# Patient Record
Sex: FEMALE | Race: BLACK OR AFRICAN AMERICAN
[De-identification: names, ages, dates, MRNs, and addresses within clinical notes are randomized per-mention and may not be internally consistent; named-entity substitution may affect disease eponyms.]

---

## 2018-03-18 NOTE — EDM.PDOC
ED HPI GENERAL MEDICAL PROBLEM





- General


Chief Complaint: ENT Problem


Stated Complaint: FEELING WELL LAST 3 DAYS, CHEST PAIN [id number]


Time Seen by Provider: 03/18/18 21:55


Source of Information: Reports: Patient


History Limitations: Reports: No Limitations





- History of Present Illness


INITIAL COMMENTS - FREE TEXT/NARRATIVE: 





3 days h/o cough sore throat poor appetite


Treatments PTA: Reports: Acetaminophen


  ** Throat


Pain Score (Numeric/FACES): 6





- Related Data


 Allergies











Allergy/AdvReac Type Severity Reaction Status Date / Time


 


No Known Allergies Allergy   Verified 03/18/18 21:51











Home Meds: 


 Home Meds





Acetaminophen [Tylenol Extra Strength] 500 mg PO Q6H PRN 08/03/15 [History]


levETIRAcetam [Levetiracetam] 750 mg PO BID 08/03/15 [History]











Past Medical History


OB/GYN History: Reports: Pregnancy


Neurological History: Reports: Seizure


Endocrine/Metabolic History: Reports: Obesity/BMI 30+





- Past Surgical History


GI Surgical History: Reports: Cholecystectomy





Social & Family History





- Family History


Family Medical History: Noncontributory





- Tobacco Use


Smoking Status *Q: Never Smoker


Second Hand Smoke Exposure: No





- Caffeine Use


Caffeine Use: Reports: Tea





- Alcohol Use


Days Per Week of Alcohol Use: 0





- Recreational Drug Use


Recreational Drug Use: No





- Living Situation & Occupation


Living situation: Reports: , with Family


Occupation: Employed





ED ROS GENERAL





- Review of Systems


Review Of Systems: ROS reveals no pertinent complaints other than HPI.





ED EXAM, GENERAL





- Physical Exam


Exam: See Below


Exam Limited By: No Limitations


General Appearance: Alert, WD/WN, Mild Distress, Other (cough spasm)


Ears: Hearing Grossly Normal


Throat/Mouth: Normal Voice, No Airway Compromise, Inflammation


Head: Atraumatic


Neck: Non-Tender, Full Range of Motion


Respiratory/Chest: No Accessory Muscle Use, Rhonchi, Wheezing.  No: Decreased 

Breath Sounds


Cardiovascular: Regular Rate, Rhythm


GI/Abdominal: Soft, Non-Tender


Neurological: Alert, Oriented, Normal Cognition, Normal Gait, No Motor/Sensory 

Deficits


Psychiatric: Normal Affect, Normal Mood


Skin Exam: Warm, Dry, Normal Color


Lymphatic: No Adenopathy





Course





- Vital Signs


Last Recorded V/S: 


 Last Vital Signs











Temp  35.7 C   03/18/18 21:44


 


Pulse  107 H  03/18/18 21:44


 


Resp  19   03/18/18 21:44


 


BP  122/78   03/18/18 21:44


 


Pulse Ox  100   03/18/18 21:44














- Orders/Labs/Meds


Orders: 


 Active Orders 24 hr











 Category Date Time Status


 


 RT Aerosol Therapy [RC] ASDIRECTED Care  03/18/18 21:54 Active


 


 CULTURE STREP A CONFIRMATION [RM] Stat Lab  03/18/18 21:53 Results


 


 STREP SCRN A RAPID W CULT CONF [RM] Stat Lab  03/18/18 21:53 Results











Meds: 


Medications














Discontinued Medications














Generic Name Dose Route Start Last Admin





  Trade Name Freq  PRN Reason Stop Dose Admin


 


Albuterol/Ipratropium  3 ml  03/18/18 21:54  03/18/18 21:57





  Duoneb 3.0-0.5 Mg/3 Ml  NEB  03/18/18 21:55  3 ml





  ONETIME ONE   Administration














- Re-Assessments/Exams


Free Text/Narrative Re-Assessment/Exam: 





03/18/18 22:28


s/p duoneb=much better





Departure





- Departure


Time of Disposition: 22:29


Disposition: Home, Self-Care 01


Condition: Good


Clinical Impression: 


 Bronchospasm with bronchitis, acute








- Discharge Information


Instructions:  Acute Bronchitis, Adult, Easy-to-Read


Forms:  ED Department Discharge


Additional Instructions: 


1) sleep as much as possible


2) drink lots of liquids


3) use neb 3 times daily for cough & wheeze


4) recheck as needed





rx given;


albuterol solution 2.5mg tid prn





- My Orders


Last 24 Hours: 


My Active Orders





03/18/18 21:53


CULTURE STREP A CONFIRMATION [RM] Stat 


STREP SCRN A RAPID W CULT CONF [RM] Stat 





03/18/18 21:54


RT Aerosol Therapy [RC] ASDIRECTED 














- Assessment/Plan


Last 24 Hours: 


My Active Orders





03/18/18 21:53


CULTURE STREP A CONFIRMATION [RM] Stat 


STREP SCRN A RAPID W CULT CONF [RM] Stat 





03/18/18 21:54


RT Aerosol Therapy [RC] ASDIRECTED

## 2018-03-19 NOTE — EDM.PDOC
ED HPI GENERAL MEDICAL PROBLEM





- General


Chief Complaint: Chest Pain


Stated Complaint: [id number] CHEST PAIN,WAIST PAIN


Time Seen by Provider: 03/19/18 19:35


Source of Information: Reports: Patient


History Limitations: Reports: No Limitations





- History of Present Illness


INITIAL COMMENTS - FREE TEXT/NARRATIVE: 





c/o continued cough, chest pain with coughing around ribs, upper abdomen and 

back. Slight intermittent fever. 


  ** Mid-Sternal Chest


Pain Score (Numeric/FACES): 10





- Related Data


 Allergies











Allergy/AdvReac Type Severity Reaction Status Date / Time


 


No Known Allergies Allergy   Verified 03/19/18 18:15











Home Meds: 


 Home Meds





Acetaminophen [Tylenol Extra Strength] 500 mg PO Q6H PRN 08/03/15 [History]


levETIRAcetam [Levetiracetam] 750 mg PO BID 08/03/15 [History]


Albuterol Sulfate 0.63 mg IH ASDIRECTED PRN 03/19/18 [History]











Past Medical History


HEENT History: Reports: None


Cardiovascular History: Reports: None


Respiratory History: Reports: None


Gastrointestinal History: Reports: None


Genitourinary History: Reports: None


OB/GYN History: Reports: Pregnancy


Musculoskeletal History: Reports: None


Neurological History: Reports: Seizure


Psychiatric History: Reports: None


Endocrine/Metabolic History: Reports: Obesity/BMI 30+


Hematologic History: Reports: None


Immunologic History: Reports: None


Oncologic (Cancer) History: Reports: None


Dermatologic History: Reports: None





- Past Surgical History


Head Surgeries/Procedures: Reports: None


GI Surgical History: Reports: Cholecystectomy





Social & Family History





- Family History


Family Medical History: Noncontributory





- Tobacco Use


Smoking Status *Q: Never Smoker


Second Hand Smoke Exposure: No





- Caffeine Use


Caffeine Use: Reports: Tea





- Alcohol Use


Days Per Week of Alcohol Use: 0





- Recreational Drug Use


Recreational Drug Use: No





- Living Situation & Occupation


Living situation: Reports: , with Family


Occupation: Employed





ED ROS GENERAL





- Review of Systems


Review Of Systems: See Below


Constitutional: Reports: Chills


Respiratory: Reports: Wheezing, Pleuritic Chest Pain, Cough


Cardiovascular: Reports: Chest Pain (with coughing).  Denies: Dyspnea on 

Exertion


GI/Abdominal: Reports: No Symptoms


Skin: Reports: No Symptoms


Neurological: Reports: No Symptoms


Psychiatric: Reports: No Symptoms





ED EXAM, GENERAL





- Physical Exam


Exam: See Below


Exam Limited By: No Limitations


General Appearance: Alert, No Apparent Distress


Eye Exam: Bilateral Eye: EOMI


Ears: Normal External Exam, Normal TMs


Throat/Mouth: Normal Inspection, Normal Lips, Other (lips cracked).  No: Normal 

Voice (hoarse)


Head: Atraumatic, Normocephalic


Neck: Normal Inspection, Supple, Non-Tender.  No: Lymphadenopathy (L), 

Lymphadenopathy (R)


Respiratory/Chest: No Respiratory Distress, Lungs Clear, Decreased Breath Sounds


Cardiovascular: Normal Peripheral Pulses, Regular Rate, Rhythm


GI/Abdominal: Normal Bowel Sounds, Soft


Back Exam: Full Range of Motion


Extremities: Normal Inspection


Neurological: Alert, Oriented, Normal Cognition


Psychiatric: Normal Affect, Normal Mood


Skin Exam: Warm, Dry, Intact, Normal Color





Course





- Vital Signs


Last Recorded V/S: 


 Last Vital Signs











Temp  97.4 F   03/19/18 18:09


 


Pulse  97   03/19/18 18:09


 


Resp  16   03/19/18 18:09


 


BP  124/83   03/19/18 18:09


 


Pulse Ox  99   03/19/18 18:09














- Orders/Labs/Meds


Orders: 


 Active Orders 24 hr











 Category Date Time Status


 


 EKG Documentation Completion [RC] URGENT Care  03/19/18 18:30 Active











Labs: 


 Laboratory Tests











  03/19/18 03/19/18 Range/Units





  18:39 18:39 


 


WBC  4.5 L   (5.0-10.0)  10^3/uL


 


RBC  4.31   (4.2-5.4)  10^6/uL


 


Hgb  11.4 L D   (12.0-16.0)  g/dL


 


Hct  34.8 L   (37.0-47.0)  %


 


MCV  80.7  D   ()  fL


 


MCH  26.5 L   (27.0-34.0)  pg


 


MCHC  32.8 L   (33.0-35.0)  g/dL


 


Plt Count  233  D   (150-450)  10^3/uL


 


Neut % (Auto)  38.9 L   (42.2-75.2)  %


 


Lymph % (Auto)  44.8   (20.5-50.1)  %


 


Mono % (Auto)  15.9 H   (2-8)  %


 


Eos % (Auto)  0.2 L   (1.0-3.0)  %


 


Baso % (Auto)  0.2   (0.0-1.0)  %


 


Sodium   137  (135-145)  mmol/L


 


Potassium   3.5 L  (3.6-5.0)  mmol/L


 


Chloride   107  (101-111)  mmol/L


 


Carbon Dioxide   23.0  (21.0-31.0)  mmol/L


 


Anion Gap   10.5  


 


BUN   5 L  (7-18)  mg/dL


 


Creatinine   0.7  (0.6-1.3)  mg/dL


 


Est Cr Clr Drug Dosing   113.88  mL/min


 


Estimated GFR (MDRD)   > 60  


 


BUN/Creatinine Ratio   7.14  


 


Glucose   105  ()  mg/dL


 


Calcium   8.0 L  (8.4-10.2)  mg/dl


 


Total Bilirubin   0.6  (0.2-1.0)  mg/dL


 


AST   29  (10-42)  IU/L


 


ALT   20  (10-60)  IU/L


 


Alkaline Phosphatase   59  ()  IU/L


 


Troponin I   < 0.02  (0.00-0.02)  ng/ml


 


Total Protein   7.2  (6.7-8.2)  g/dl


 


Albumin   3.7  (3.2-5.5)  g/dl


 


Globulin   3.5  


 


Albumin/Globulin Ratio   1.06  














- Radiology Interpretation


Free Text/Narrative:: 





CXR: normal





Departure





- Departure


Time of Disposition: 19:43


Disposition: Home, Self-Care 01


Condition: Good


Clinical Impression: 


 Bronchitis





URI (upper respiratory infection)


Qualifiers:


 URI type: unspecified viral URI Qualified Code(s): J06.9 - Acute upper 

respiratory infection, unspecified








- Discharge Information


Instructions:  Viral Respiratory Infection, Easy-To-Read


Referrals: 


PCP,None [Ordering Only Provider] - 


Forms:  ED Department Discharge


Additional Instructions: 


humidification


Robitussin per label for cough


alternate tylenol and ibuprofen for fever/discomfort


increase fluid intake

## 2018-03-21 NOTE — EKG
03/19/2018 - PAULA STUART -

 

This 12-lead EKG shows normal sinus rhythm with a ventricular rate of 87.

Normal axis and intervals.  No acute ST-segment or T-wave changes.

 

DD:  03/21/2018 06:01:53

DT:  03/21/2018 06:16:13

[facility]

Job #:  [id number]

## 2020-08-23 ENCOUNTER — HOSPITAL ENCOUNTER (EMERGENCY)
Dept: HOSPITAL 43 - DL.ED | Age: 40
Discharge: HOME | End: 2020-08-23
Payer: COMMERCIAL

## 2020-08-23 VITALS — HEART RATE: 188 BPM | SYSTOLIC BLOOD PRESSURE: 110 MMHG | DIASTOLIC BLOOD PRESSURE: 75 MMHG

## 2020-08-23 DIAGNOSIS — Z79.899: ICD-10-CM

## 2020-08-23 DIAGNOSIS — R56.9: ICD-10-CM
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
## 2020-08-23 DIAGNOSIS — S29.011A: ICD-10-CM

## 2020-08-23 DIAGNOSIS — U07.1: Primary | ICD-10-CM

## 2020-08-23 DIAGNOSIS — E66.9: ICD-10-CM

## 2020-08-23 LAB
ANION GAP SERPL CALC-SCNC: 19.4 MEQ/L (ref 7–13)
CHLORIDE SERPL-SCNC: 99 MMOL/L (ref 98–107)
SODIUM SERPL-SCNC: 138 MMOL/L (ref 136–145)

## 2020-08-23 PROCEDURE — 80053 COMPREHEN METABOLIC PANEL: CPT

## 2020-08-23 PROCEDURE — 99284 EMERGENCY DEPT VISIT MOD MDM: CPT

## 2020-08-23 PROCEDURE — 81001 URINALYSIS AUTO W/SCOPE: CPT

## 2020-08-23 PROCEDURE — 71045 X-RAY EXAM CHEST 1 VIEW: CPT

## 2020-08-23 PROCEDURE — U0002 COVID-19 LAB TEST NON-CDC: HCPCS

## 2020-08-23 PROCEDURE — 85025 COMPLETE CBC W/AUTO DIFF WBC: CPT

## 2020-08-23 PROCEDURE — 99283 EMERGENCY DEPT VISIT LOW MDM: CPT

## 2020-08-23 PROCEDURE — 36415 COLL VENOUS BLD VENIPUNCTURE: CPT

## 2020-08-23 PROCEDURE — 87635 SARS-COV-2 COVID-19 AMP PRB: CPT

## 2020-08-23 PROCEDURE — 87040 BLOOD CULTURE FOR BACTERIA: CPT

## 2020-08-23 PROCEDURE — 83605 ASSAY OF LACTIC ACID: CPT

## 2020-08-23 PROCEDURE — 96374 THER/PROPH/DIAG INJ IV PUSH: CPT

## 2020-08-23 PROCEDURE — 71260 CT THORAX DX C+: CPT

## 2020-08-23 NOTE — CR
PROCEDURE INFORMATION: 

Exam: XR Chest, 1 View 

Exam date and time: 8/23/2020 10:24 AM 

Age: 39 years old 

Clinical indication: Shortness of breath 



TECHNIQUE: 

Imaging protocol: XR of the chest 

Views: 1 view. 



COMPARISON: 

CR Chest 1V Frontal 3/19/2018 6:55 PM 



FINDINGS: 

Lungs: Lung volumes are decreased bilaterally. Bilateral transverse lung 

densities appear to be compatible with atelectasis, right greater than left. 

There are no additional coalescing infiltrates. 

Pleural space: No pleural effusion. No pneumothorax. 

Heart/Mediastinum: The mediastinal contour is normal. No cardiomegaly. 

Bones/joints: The skeletal structures and soft tissues show no evidence of 

fracture or other acute processes. 



IMPRESSION: 

1. Decreased lung volumes with probable bibasilar atelectatic changes. Upright 

two view chest would be helpful for further characterization. If patient is 

unable to cooperate consider noncontrast CT chest.

## 2020-08-23 NOTE — EDM.PDOC
Scribed by Kaya Olvera 08/23/20 1414 for Onelia Benitez MD





ED HPI GENERAL MEDICAL PROBLEM





- General


Chief Complaint: Respiratory Problem


Stated Complaint: HURTS TO BREATH


Time Seen by Provider: 08/23/20 09:50


Source of Information: Reports: Patient, RN, RN Notes Reviewed


History Limitations: Reports: No Limitations





- History of Present Illness


INITIAL COMMENTS - FREE TEXT/NARRATIVE: 


Patient presents to ED stating her pain started on Tuesday with cough. Her 

 was tested positive for COVID. The pain started 2 days ago to right back

and radiates to front. It is sharp up to 10/10 off /on. The cough is productive.




Onset: Gradual


Duration: Getting Worse


Location: Reports: Chest, Back


Quality: Reports: Ache


Severity: Moderate


Improves with: Reports: None


Worsens with: Reports: None


Associated Symptoms: Reports: No Other Symptoms


Treatments PTA: Reports: Acetaminophen


  ** Right Thoracic


Pain Score (Numeric/FACES): 10





- Related Data


                                    Allergies











Allergy/AdvReac Type Severity Reaction Status Date / Time


 


No Known Allergies Allergy   Verified 08/23/20 09:32











Home Meds: 


                                    Home Meds





Acetaminophen [Tylenol Extra Strength] 500 mg PO Q6H PRN 08/03/15 [History]


levETIRAcetam [Levetiracetam] 750 mg PO BID 08/03/15 [History]


Albuterol Sulfate 0.63 mg IH ASDIRECTED PRN 03/19/18 [History]











Past Medical History


HEENT History: Reports: None


Cardiovascular History: Reports: None


Respiratory History: Reports: None


Gastrointestinal History: Reports: None


Genitourinary History: Reports: None


OB/GYN History: Reports: Pregnancy


Musculoskeletal History: Reports: None


Neurological History: Reports: Seizure


Psychiatric History: Reports: None


Endocrine/Metabolic History: Reports: Obesity/BMI 30+


Hematologic History: Reports: None


Immunologic History: Reports: None


Oncologic (Cancer) History: Reports: None


Dermatologic History: Reports: None





- Past Surgical History


Head Surgeries/Procedures: Reports: None


GI Surgical History: Reports: Cholecystectomy





Social & Family History





- Family History


Family Medical History: Noncontributory





- Tobacco Use


Smoking Status *Q: Never Smoker


Second Hand Smoke Exposure: No





- Caffeine Use


Caffeine Use: Reports: Tea





- Recreational Drug Use


Recreational Drug Use: No





- Living Situation & Occupation


Living situation: Reports: , with Family


Occupation: Employed





ED ROS GENERAL





- Review of Systems


Review Of Systems: Comprehensive ROS is negative, except as noted in HPI.





ED EXAM, GENERAL





- Physical Exam


Exam: See Below


Exam Limited By: No Limitations


General Appearance: Mild Distress (2/2 pain. )


Nose: Normal Inspection


Head: Atraumatic, Normocephalic


Neck: Normal Inspection


Respiratory/Chest: No Accessory Muscle Use, Decreased Breath Sounds, Other (mild

 respiratory distress. TTP right lower chest. )


Cardiovascular: No Murmur, Tachycardia


GI/Abdominal: Normal Bowel Sounds, Soft, Non-Tender, No Organomegaly, No 

Distention, No Abnormal Bruit, No Mass


 (Female) Exam: Deferred


Rectal (Female) Exam: Deferred


Back Exam: Normal Inspection, Full Range of Motion, NT


Extremities: Normal Inspection, Normal Range of Motion, Non-Tender, Normal 

Capillary Refill, No Pedal Edema


Neurological: Alert, Oriented, CN II-XII Intact, Normal Cognition, Normal Gait, 

Normal Reflexes, No Motor/Sensory Deficits


Psychiatric: Normal Affect, Normal Mood


Skin Exam: Warm, Dry, Intact, Normal Color, No Rash


Lymphatic: No Adenopathy





Course





- Vital Signs


Last Recorded V/S: 


                                Last Vital Signs











Temp  96.6 F L  08/23/20 09:19


 


Pulse  188 H  08/23/20 09:19


 


Resp  20   08/23/20 09:19


 


BP  110/75   08/23/20 09:19


 


Pulse Ox  95   08/23/20 09:19














- Orders/Labs/Meds


Orders: 


                               Active Orders 24 hr











 Category Date Time Status


 


 CULTURE BLOOD [BC] Stat Lab  08/23/20 10:12 Received


 


 Blood Culture x2 Reflex Set [OM.PC] Stat Oth  08/23/20 09:52 Ordered


 


 Peripheral IV Insertion Adult [OM.PC] Stat Oth  08/23/20 09:52 Ordered











Labs: 


                                Laboratory Tests











  08/23/20 08/23/20 08/23/20 Range/Units





  09:22 10:12 10:12 


 


WBC   5.5   (5.0-10.0)  10^3/uL


 


RBC   5.22   (4.2-5.4)  10^6/uL


 


Hgb   13.5  D   (12.0-16.0)  g/dL


 


Hct   40.9   (37.0-47.0)  %


 


MCV   78.4 L   ()  fL


 


MCH   25.9 L   (27.0-34.0)  pg


 


MCHC   33.0   (33.0-35.0)  g/dL


 


Plt Count   219   (150-450)  10^3/uL


 


Neut % (Auto)   58.5   (42.2-75.2)  %


 


Lymph % (Auto)   32.8   (20.5-50.1)  %


 


Mono % (Auto)   8.7 H   (2-8)  %


 


Eos % (Auto)   0.0 L   (1.0-3.0)  %


 


Baso % (Auto)   0.0   (0.0-1.0)  %


 


Sodium    138  (136-145)  mmol/L


 


Potassium    3.4 L  (3.5-5.1)  mmol/L


 


Chloride    99  ()  mmol/L


 


Carbon Dioxide    23  (21-32)  mmol/L


 


Anion Gap    19.4 H  (7-13)  mEq/L


 


BUN    6 L  (7-18)  mg/dL


 


Creatinine    1.13 H  (0.55-1.02)  mg/dL


 


Est Cr Clr Drug Dosing    69.16  mL/min


 


Estimated GFR (MDRD)    > 60  


 


BUN/Creatinine Ratio    5.3  (No establ ref range)  


 


Glucose    120 H  (74-99)  mg/dL


 


Lactic Acid     (0.4-2.0)  mmol/L


 


Calcium    8.1 L  (8.5-10.1)  mg/dL


 


Total Bilirubin    0.4  (0.2-1.0)  mg/dL


 


AST    55 H  (15-37)  U/L


 


ALT    72 H  (14-59)  U/L


 


Alkaline Phosphatase    64  ()  U/L


 


Total Protein    8.8 H  (6.4-8.2)  g/dL


 


Albumin    3.9  (3.4-5.0)  g/dL


 


Globulin    4.9  


 


Albumin/Globulin Ratio    0.8  


 


Urine Color     (YELLOW)  


 


Urine Appearance     (CLEAR)  


 


Urine pH     (5.0-9.0)  


 


Ur Specific Gravity     (1.005-1.030)  


 


Urine Protein     (NEGATIVE)  


 


Urine Glucose (UA)     (NEGATIVE)  


 


Urine Ketones     (NEGATIVE)  


 


Urine Occult Blood     (NEGATIVE)  


 


Urine Nitrite     (NEGATIVE)  


 


Urine Bilirubin     (NEGATIVE)  


 


Urine Urobilinogen     (0.2-1.0)  mg/dL


 


Ur Leukocyte Esterase     (NEGATIVE)  


 


U Hyaline Cast (Auto)     


 


Urine RBC     /HPF


 


Urine WBC     (0-5/HPF)  /HPF


 


Ur Epithelial Cells     (NOT SEEN)  /HPF


 


Amorphous Sediment     (NOT SEEN)  /HPF


 


Urine Bacteria     (0-FEW/HPF)  /HPF


 


Fine Granular Casts     (NOT SEEN)  /LPF


 


Urine Mucus     (NOT SEEN)  /LPF


 


COVID-19 ([facility])  Positive H    (NEGATIVE)  














  08/23/20 08/23/20 Range/Units





  10:12 10:41 


 


WBC    (5.0-10.0)  10^3/uL


 


RBC    (4.2-5.4)  10^6/uL


 


Hgb    (12.0-16.0)  g/dL


 


Hct    (37.0-47.0)  %


 


MCV    ()  fL


 


MCH    (27.0-34.0)  pg


 


MCHC    (33.0-35.0)  g/dL


 


Plt Count    (150-450)  10^3/uL


 


Neut % (Auto)    (42.2-75.2)  %


 


Lymph % (Auto)    (20.5-50.1)  %


 


Mono % (Auto)    (2-8)  %


 


Eos % (Auto)    (1.0-3.0)  %


 


Baso % (Auto)    (0.0-1.0)  %


 


Sodium    (136-145)  mmol/L


 


Potassium    (3.5-5.1)  mmol/L


 


Chloride    ()  mmol/L


 


Carbon Dioxide    (21-32)  mmol/L


 


Anion Gap    (7-13)  mEq/L


 


BUN    (7-18)  mg/dL


 


Creatinine    (0.55-1.02)  mg/dL


 


Est Cr Clr Drug Dosing    mL/min


 


Estimated GFR (MDRD)    


 


BUN/Creatinine Ratio    (No establ ref range)  


 


Glucose    (74-99)  mg/dL


 


Lactic Acid  1.0   (0.4-2.0)  mmol/L


 


Calcium    (8.5-10.1)  mg/dL


 


Total Bilirubin    (0.2-1.0)  mg/dL


 


AST    (15-37)  U/L


 


ALT    (14-59)  U/L


 


Alkaline Phosphatase    ()  U/L


 


Total Protein    (6.4-8.2)  g/dL


 


Albumin    (3.4-5.0)  g/dL


 


Globulin    


 


Albumin/Globulin Ratio    


 


Urine Color   Dark yellow  (YELLOW)  


 


Urine Appearance   Cloudy  (CLEAR)  


 


Urine pH   6.5  (5.0-9.0)  


 


Ur Specific Gravity   >= 1.030  (1.005-1.030)  


 


Urine Protein   100 H  (NEGATIVE)  


 


Urine Glucose (UA)   Negative  (NEGATIVE)  


 


Urine Ketones   >=160 H  (NEGATIVE)  


 


Urine Occult Blood   Negative  (NEGATIVE)  


 


Urine Nitrite   Negative  (NEGATIVE)  


 


Urine Bilirubin   Small H  (NEGATIVE)  


 


Urine Urobilinogen   2.0 H  (0.2-1.0)  mg/dL


 


Ur Leukocyte Esterase   Negative  (NEGATIVE)  


 


U Hyaline Cast (Auto)   Few  


 


Urine RBC   0-5  /HPF


 


Urine WBC   0-5  (0-5/HPF)  /HPF


 


Ur Epithelial Cells   Moderate H  (NOT SEEN)  /HPF


 


Amorphous Sediment   Moderate H  (NOT SEEN)  /HPF


 


Urine Bacteria   Few  (0-FEW/HPF)  /HPF


 


Fine Granular Casts   Rare H  (NOT SEEN)  /LPF


 


Urine Mucus   Moderate H  (NOT SEEN)  /LPF


 


COVID-19 ([facility])    (NEGATIVE)  











Meds: 


Medications














Discontinued Medications














Generic Name Dose Route Start Last Admin





  Trade Name Freq  PRN Reason Stop Dose Admin


 


Fentanyl  50 mcg  08/23/20 11:08  08/23/20 11:20





  Sublimaze  IVPUSH  08/23/20 11:09  50 mcg





  ONETIME ONE   Administration


 


Iopamidol  100 ml  08/23/20 12:11  08/23/20 12:28





  Isovue-370 (76%)  IVPUSH  08/23/20 12:12  80 ml





  ONETIME ONE   Administration


 


Sodium Chloride  10 ml  08/23/20 09:52  08/23/20 11:19





  Saline Flush  FLUSH   10 ml





  ASDIRECTED PRN   Administration





  Keep Vein Open  














Departure





- Departure


Time of Disposition: 13:05


Disposition: Home, Self-Care 01


Condition: Fair


Clinical Impression: 


 COVID-19





Muscle strain of chest wall


Qualifiers:


 Encounter type: initial encounter Qualified Code(s): S29.011A - Strain of 

muscle and tendon of front wall of thorax, initial encounter








- Discharge Information


*PRESCRIPTION DRUG MONITORING PROGRAM REVIEWED*: Not Applicable


*COPY OF PRESCRIPTION DRUG MONITORING REPORT IN PATIENT GOLD: Not Applicable


Instructions:  Muscle Strain, Easy-to-Read, Coronavirus Information 03/16/20, 

Prevent the Spread of COVID-19 if You Are Sick - CDC


Forms:  ED Department Discharge


Additional Instructions: 


Take Ibuprofen and Tylenol as directed


Quarantine at home for at least 14 days. 


Follow up with PCP as needed. 


 





Sepsis Event Note (ED)





- Evaluation


Sepsis Screening Result: Possible Sepsis Risk





- Focused Exam


Vital Signs: 


                                   Vital Signs











  Temp Pulse Resp BP Pulse Ox


 


 08/23/20 09:19  96.6 F L  188 H  20  110/75  95














- My Orders


Last 24 Hours: 


My Active Orders





08/23/20 09:52


Blood Culture x2 Reflex Set [OM.PC] Stat 


Peripheral IV Insertion Adult [OM.PC] Stat 





08/23/20 10:12


CULTURE BLOOD [BC] Stat 














- Assessment/Plan


Last 24 Hours: 


My Active Orders





08/23/20 09:52


Blood Culture x2 Reflex Set [OM.PC] Stat 


Peripheral IV Insertion Adult [OM.PC] Stat 





08/23/20 10:12


CULTURE BLOOD [BC] Stat 














I have read and agree with the documentation that has been completed regarding 

this visit. By signing this record, I attest that the documentation was 

completed in my physical presence and is an accurate record of the encounter.

## 2020-08-23 NOTE — CT
PROCEDURE INFORMATION: 

Exam: CT Chest With Contrast 

Exam date and time: 8/23/2020 11:29 AM 

Age: 39 years old 

Clinical indication: Shortness of breath; Additional info: Shortness of breath, 

recent tubal ligation 



TECHNIQUE: 

Imaging protocol: Computed tomography of the chest with intravenous contrast. 

Radiation optimization: All CT scans at this facility use at least one of these 

dose optimization techniques: automated exposure control; mA and/or kV 

adjustment per patient size (includes targeted exams where dose is matched to 

clinical indication); or iterative reconstruction. 

Contrast material: ISOVUE 370; Contrast volume: 80 ml; Contrast route: 

INTRAVENOUS (IV);  



COMPARISON: 

CR Chest 1V Frontal 8/23/2020 10:24 AM 



FINDINGS: 

Thoracic inlet: Normal. No thyroid masses.

Lungs: There are decreased bilateral lung volumes. There are multifocal 

peripheral small opacities throughout the lungs right greater than left. 

Bilateral bibasilar atelectasis. 

Pleural space: No pneumothorax. No pleural effusion. 

Heart: The cardiac structures are normal. 

Mediastinal space: The mediastinal contour is normal. 

Pulmonary arteries: No filling defects within the main or bilateral pulmonary 

arteries. Poor opacification of right lower lobe subsegmental pulmonary 

branches. 

Aorta: The aorta has normal caliber and opacification without dissection or 

aneurysm. 

Lymph nodes: No enlarged lymph nodes. 



Bones/joints: The skeletal structures and soft tissues show no evidence of 

fracture or other acute processes. 

Soft tissues: Unremarkable. 



IMPRESSION: 

1. Multiple small bilateral peripheral ground glass opacities or infiltrates. 

Differential diagnosis compatible with pneumonitis of the infectious and 

noninfectious variety. 

2. Bilateral basilar discoid atelectasis, right greater than left. 

3. No evidence of pulmonary embolic disease within the main, right and left 

pulmonary arteries. Subsegmental branches not well opacified. If PO2 decreases 

or patient does not respond to conservative therapy consider repeat CTA of the 

chest.

## 2021-07-22 ENCOUNTER — HOSPITAL ENCOUNTER (EMERGENCY)
Dept: HOSPITAL 43 - DL.ED | Age: 41
Discharge: NURSING, REHAB OR LONG-TERM CARE | End: 2021-07-22
Payer: COMMERCIAL

## 2021-07-22 VITALS — SYSTOLIC BLOOD PRESSURE: 126 MMHG | DIASTOLIC BLOOD PRESSURE: 80 MMHG | HEART RATE: 113 BPM

## 2021-07-22 DIAGNOSIS — Z79.899: ICD-10-CM

## 2021-07-22 DIAGNOSIS — E66.9: ICD-10-CM
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
## 2021-07-22 DIAGNOSIS — R56.9: Primary | ICD-10-CM

## 2021-07-22 DIAGNOSIS — R79.89: ICD-10-CM
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
## 2021-07-22 LAB
ANION GAP SERPL CALC-SCNC: 18.4 MEQ/L (ref 7–13)
CHLORIDE SERPL-SCNC: 102 MMOL/L (ref 98–107)
SODIUM SERPL-SCNC: 137 MMOL/L (ref 136–145)

## 2021-07-22 PROCEDURE — 70450 CT HEAD/BRAIN W/O DYE: CPT

## 2021-07-22 PROCEDURE — 80053 COMPREHEN METABOLIC PANEL: CPT

## 2021-07-22 PROCEDURE — 80307 DRUG TEST PRSMV CHEM ANLYZR: CPT

## 2021-07-22 PROCEDURE — 86140 C-REACTIVE PROTEIN: CPT

## 2021-07-22 PROCEDURE — 99285 EMERGENCY DEPT VISIT HI MDM: CPT

## 2021-07-22 PROCEDURE — 83605 ASSAY OF LACTIC ACID: CPT

## 2021-07-22 PROCEDURE — 36415 COLL VENOUS BLD VENIPUNCTURE: CPT

## 2021-07-22 PROCEDURE — 93005 ELECTROCARDIOGRAM TRACING: CPT

## 2021-07-22 PROCEDURE — 84484 ASSAY OF TROPONIN QUANT: CPT

## 2021-07-22 PROCEDURE — 85025 COMPLETE CBC W/AUTO DIFF WBC: CPT
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
